# Patient Record
Sex: MALE | HISPANIC OR LATINO
[De-identification: names, ages, dates, MRNs, and addresses within clinical notes are randomized per-mention and may not be internally consistent; named-entity substitution may affect disease eponyms.]

---

## 2018-01-22 ENCOUNTER — CHARTING TRANS (OUTPATIENT)
Dept: OTHER | Age: 28
End: 2018-01-22

## 2018-01-26 ENCOUNTER — CHARTING TRANS (OUTPATIENT)
Dept: OTHER | Age: 28
End: 2018-01-26

## 2018-01-26 ENCOUNTER — LAB SERVICES (OUTPATIENT)
Dept: OTHER | Age: 28
End: 2018-01-26

## 2018-01-26 LAB
ALBUMIN SERPL BCG-MCNC: 5.3 G/DL (ref 3.6–5.1)
ALP SERPL-CCNC: 66 U/L (ref 30–130)
ALT SERPL W/O P-5'-P-CCNC: 41 U/L (ref 17–47)
AST SERPL-CCNC: 29 U/L (ref 14–43)
BASOPHIL %: 0.5 % (ref 0–1.2)
BASOPHIL ABSOLUTE #: 0 10*3/UL (ref 0–0.1)
BILIRUB SERPL-MCNC: 0.5 MG/DL (ref 0–1.3)
BUN SERPL-MCNC: 21 MG/DL (ref 6–27)
CALCIUM SERPL-MCNC: 9.9 MG/DL (ref 8.6–10.6)
CHLORIDE SERPL-SCNC: 101 MMOL/L (ref 96–107)
CREATININE, SERUM: 1 MG/DL (ref 0.6–1.6)
DIFFERENTIAL TYPE: ABNORMAL
EOSINOPHIL %: 0.7 % (ref 0–10)
EOSINOPHIL ABSOLUTE #: 0.1 10*3/UL (ref 0–0.5)
GFR SERPL CREATININE-BSD FRML MDRD: >60 ML/MIN/{1.73M2}
GFR SERPL CREATININE-BSD FRML MDRD: >60 ML/MIN/{1.73M2}
GLUCOSE SERPL-MCNC: 94 MG/DL (ref 70–200)
HCO3 SERPL-SCNC: 28 MMOL/L (ref 22–32)
HEMATOCRIT: 39.1 % (ref 40–51)
HEMOGLOBIN: 13.9 G/DL (ref 13.7–17.5)
LYMPH PERCENT: 22.9 % (ref 20.5–51.1)
LYMPHOCYTE ABSOLUTE #: 1.7 10*3/UL (ref 1.2–3.4)
MEAN CORPUSCULAR HGB CONCENTRATION: 35.5 % (ref 32–36)
MEAN CORPUSCULAR HGB: 29.9 PG (ref 27–34)
MEAN CORPUSCULAR VOLUME: 84.1 FL (ref 79–95)
MEAN PLATELET VOLUME: 11.5 FL (ref 8.6–12.4)
MONOCYTE ABSOLUTE #: 0.5 10*3/UL (ref 0.2–0.9)
MONOCYTE PERCENT: 7.2 % (ref 4.3–12.9)
NEUTROPHIL ABSOLUTE #: 5.1 10*3/UL (ref 1.4–6.5)
NEUTROPHIL PERCENT: 68.7 % (ref 34–73.5)
PLATELET COUNT: 175 10*3/UL (ref 150–400)
POTASSIUM SERPL-SCNC: 4.5 MMOL/L (ref 3.5–5.3)
PROT SERPL-MCNC: 8.5 G/DL (ref 6.4–8.5)
RED BLOOD CELL COUNT: 4.65 10*6/UL (ref 3.9–5.7)
RED CELL DISTRIBUTION WIDTH: 12.2 % (ref 11.3–14.8)
SODIUM SERPL-SCNC: 141 MMOL/L (ref 136–146)
TESTOST SERPL-MCNC: 230 NG/DL (ref 200–813)
TSH SERPL DL<=0.05 MIU/L-ACNC: 1.17 M[IU]/L (ref 0.3–4.82)
WHITE BLOOD CELL COUNT: 7.4 10*3/UL (ref 4–10)

## 2018-01-29 ENCOUNTER — CHARTING TRANS (OUTPATIENT)
Dept: OTHER | Age: 28
End: 2018-01-29

## 2018-02-01 ENCOUNTER — CHARTING TRANS (OUTPATIENT)
Dept: OTHER | Age: 28
End: 2018-02-01

## 2018-02-02 ENCOUNTER — CHARTING TRANS (OUTPATIENT)
Dept: OTHER | Age: 28
End: 2018-02-02

## 2018-02-02 ENCOUNTER — BH HISTORICAL (OUTPATIENT)
Dept: OTHER | Age: 28
End: 2018-02-02

## 2018-03-01 ENCOUNTER — BH HISTORICAL (OUTPATIENT)
Dept: OTHER | Age: 28
End: 2018-03-01

## 2018-03-02 ENCOUNTER — BH HISTORICAL (OUTPATIENT)
Dept: OTHER | Age: 28
End: 2018-03-02

## 2018-03-06 ENCOUNTER — CHARTING TRANS (OUTPATIENT)
Dept: OTHER | Age: 28
End: 2018-03-06

## 2018-03-14 ENCOUNTER — CHARTING TRANS (OUTPATIENT)
Dept: OTHER | Age: 28
End: 2018-03-14

## 2018-03-19 ENCOUNTER — CHARTING TRANS (OUTPATIENT)
Dept: OTHER | Age: 28
End: 2018-03-19

## 2018-05-07 ENCOUNTER — APPOINTMENT (OUTPATIENT)
Dept: GENERAL RADIOLOGY | Facility: HOSPITAL | Age: 28
End: 2018-05-07
Attending: EMERGENCY MEDICINE
Payer: COMMERCIAL

## 2018-05-07 ENCOUNTER — HOSPITAL ENCOUNTER (EMERGENCY)
Facility: HOSPITAL | Age: 28
Discharge: HOME | End: 2018-05-07
Attending: EMERGENCY MEDICINE
Payer: COMMERCIAL

## 2018-05-07 VITALS
HEIGHT: 68 IN | HEART RATE: 97 BPM | WEIGHT: 177 LBS | OXYGEN SATURATION: 97 % | TEMPERATURE: 98 F | RESPIRATION RATE: 18 BRPM | DIASTOLIC BLOOD PRESSURE: 78 MMHG | BODY MASS INDEX: 26.83 KG/M2 | SYSTOLIC BLOOD PRESSURE: 132 MMHG

## 2018-05-07 DIAGNOSIS — S50.12XA CONTUSION OF LEFT FOREARM, INITIAL ENCOUNTER: ICD-10-CM

## 2018-05-07 DIAGNOSIS — L08.9 SKIN INFECTION: Primary | ICD-10-CM

## 2018-05-07 PROCEDURE — 99283 EMERGENCY DEPT VISIT LOW MDM: CPT

## 2018-05-07 PROCEDURE — 73090 X-RAY EXAM OF FOREARM: CPT | Performed by: EMERGENCY MEDICINE

## 2018-05-07 RX ORDER — IBUPROFEN 600 MG/1
600 TABLET ORAL ONCE
Status: COMPLETED | OUTPATIENT
Start: 2018-05-07 | End: 2018-05-07

## 2018-05-07 RX ORDER — CEPHALEXIN 500 MG/1
500 CAPSULE ORAL 4 TIMES DAILY
Qty: 10 CAPSULE | Refills: 0 | Status: SHIPPED | OUTPATIENT
Start: 2018-05-07 | End: 2018-05-14

## 2018-05-07 NOTE — ED INITIAL ASSESSMENT (HPI)
Patient reports on Saturday he was wrestling with some friends, fell and hurt his left forearm.   + pain and swelling   Denies other injury

## 2018-05-07 NOTE — ED PROVIDER NOTES
Patient Seen in: [facility] Emergency Department    History   Patient presents with:  Upper Extremity Injury (musculoskeletal)    Stated Complaint: left forearm injury- Open fx?     HPI    65-year-old male complaining of left arm pain the patient fel diagnosis)  Contusion of left forearm, initial encounter    Disposition:  Discharge  5/7/2018  1:10 pm    Follow-up:  Sara Byrd MD    In 1 week          Medications Prescribed:  Current Discharge Medication List    START taking these medications

## 2018-11-23 ENCOUNTER — IMAGING SERVICES (OUTPATIENT)
Dept: OTHER | Age: 28
End: 2018-11-23

## 2019-01-01 ENCOUNTER — EXTERNAL RECORD (OUTPATIENT)
Dept: HEALTH INFORMATION MANAGEMENT | Facility: OTHER | Age: 29
End: 2019-01-01

## 2019-03-06 VITALS
HEIGHT: 69 IN | BODY MASS INDEX: 25.62 KG/M2 | SYSTOLIC BLOOD PRESSURE: 122 MMHG | WEIGHT: 173 LBS | DIASTOLIC BLOOD PRESSURE: 80 MMHG | TEMPERATURE: 98.2 F | OXYGEN SATURATION: 98 % | HEART RATE: 74 BPM

## 2019-03-06 VITALS
SYSTOLIC BLOOD PRESSURE: 126 MMHG | OXYGEN SATURATION: 98 % | BODY MASS INDEX: 25.92 KG/M2 | HEIGHT: 69 IN | DIASTOLIC BLOOD PRESSURE: 80 MMHG | WEIGHT: 175 LBS | HEART RATE: 76 BPM

## 2019-05-09 ENCOUNTER — MED INFO FORMS (OUTPATIENT)
Dept: HEALTH INFORMATION MANAGEMENT | Facility: OTHER | Age: 29
End: 2019-05-09

## 2019-05-09 ENCOUNTER — IMAGING SERVICES (OUTPATIENT)
Dept: GENERAL RADIOLOGY | Age: 29
End: 2019-05-09
Attending: FAMILY MEDICINE

## 2019-05-09 ENCOUNTER — OFFICE VISIT (OUTPATIENT)
Dept: SPORTS MEDICINE | Age: 29
End: 2019-05-09

## 2019-05-09 ENCOUNTER — TELEPHONE (OUTPATIENT)
Dept: SCHEDULING | Age: 29
End: 2019-05-09

## 2019-05-09 VITALS — WEIGHT: 189 LBS | BODY MASS INDEX: 28.64 KG/M2 | HEIGHT: 68 IN

## 2019-05-09 DIAGNOSIS — S42.021A CLOSED DISPLACED FRACTURE OF SHAFT OF RIGHT CLAVICLE, INITIAL ENCOUNTER: Primary | ICD-10-CM

## 2019-05-09 DIAGNOSIS — M25.611 DECREASED RIGHT SHOULDER RANGE OF MOTION: ICD-10-CM

## 2019-05-09 DIAGNOSIS — S42.001A CLOSED NONDISPLACED FRACTURE OF RIGHT CLAVICLE, UNSPECIFIED PART OF CLAVICLE, INITIAL ENCOUNTER: ICD-10-CM

## 2019-05-09 DIAGNOSIS — F90.2 ATTENTION DEFICIT HYPERACTIVITY DISORDER (ADHD), COMBINED TYPE: ICD-10-CM

## 2019-05-09 DIAGNOSIS — M25.511 ACUTE PAIN OF RIGHT SHOULDER: ICD-10-CM

## 2019-05-09 PROCEDURE — 99204 OFFICE O/P NEW MOD 45 MIN: CPT | Performed by: FAMILY MEDICINE

## 2019-05-09 PROCEDURE — 73000 X-RAY EXAM OF COLLAR BONE: CPT | Performed by: RADIOLOGY

## 2019-05-09 RX ORDER — ESCITALOPRAM OXALATE 10 MG/1
15 TABLET ORAL
COMMUNITY
Start: 2018-02-21 | End: 2019-05-28

## 2019-05-09 RX ORDER — ALPRAZOLAM 1 MG/1
1 TABLET ORAL
COMMUNITY
End: 2019-05-28

## 2019-05-09 RX ORDER — IBUPROFEN 800 MG/1
800 TABLET ORAL EVERY 6 HOURS PRN
COMMUNITY

## 2019-05-09 RX ORDER — DEXTROAMPHETAMINE SACCHARATE, AMPHETAMINE ASPARTATE, DEXTROAMPHETAMINE SULFATE AND AMPHETAMINE SULFATE 5; 5; 5; 5 MG/1; MG/1; MG/1; MG/1
TABLET ORAL
Refills: 0 | COMMUNITY
Start: 2019-04-08 | End: 2019-08-06 | Stop reason: ALTCHOICE

## 2019-05-09 RX ORDER — MELOXICAM 15 MG/1
15 TABLET ORAL
COMMUNITY
Start: 2018-05-10 | End: 2019-08-06 | Stop reason: ALTCHOICE

## 2019-05-09 RX ORDER — TESTOSTERONE GEL, 1% 10 MG/G
GEL TRANSDERMAL
COMMUNITY
Start: 2018-02-02 | End: 2019-08-06 | Stop reason: ALTCHOICE

## 2019-05-09 RX ORDER — HYDROCODONE BITARTRATE AND ACETAMINOPHEN 5; 325 MG/1; MG/1
1 TABLET ORAL EVERY 6 HOURS PRN
COMMUNITY
End: 2019-05-28

## 2019-05-09 RX ORDER — DEXTROAMPHETAMINE SACCHARATE, AMPHETAMINE ASPARTATE MONOHYDRATE, DEXTROAMPHETAMINE SULFATE AND AMPHETAMINE SULFATE 7.5; 7.5; 7.5; 7.5 MG/1; MG/1; MG/1; MG/1
CAPSULE, EXTENDED RELEASE ORAL
Refills: 0 | COMMUNITY
Start: 2019-04-24 | End: 2019-08-06 | Stop reason: ALTCHOICE

## 2019-05-09 RX ORDER — ATOMOXETINE 40 MG/1
40 CAPSULE ORAL
COMMUNITY
Start: 2018-02-21 | End: 2019-08-06 | Stop reason: ALTCHOICE

## 2019-05-09 RX ORDER — HYDROXYZINE HYDROCHLORIDE 25 MG/1
25 TABLET, FILM COATED ORAL
COMMUNITY
Start: 2018-02-21 | End: 2019-05-28

## 2019-05-09 ASSESSMENT — ENCOUNTER SYMPTOMS
COLOR CHANGE: 0
BRUISES/BLEEDS EASILY: 0
ACTIVITY CHANGE: 0
FEVER: 0
PHOTOPHOBIA: 0
CHILLS: 0
WEAKNESS: 0
NUMBNESS: 0
BACK PAIN: 0
WOUND: 0
CHEST TIGHTNESS: 0
NAUSEA: 0
DIARRHEA: 0
SHORTNESS OF BREATH: 0
ADENOPATHY: 0
VOMITING: 0
WHEEZING: 0

## 2019-05-10 ENCOUNTER — TELEPHONE (OUTPATIENT)
Dept: SPORTS MEDICINE | Age: 29
End: 2019-05-10

## 2019-05-10 PROBLEM — F90.2 ATTENTION DEFICIT HYPERACTIVITY DISORDER (ADHD), COMBINED TYPE: Status: ACTIVE | Noted: 2019-05-10

## 2019-05-13 ENCOUNTER — TELEPHONE (OUTPATIENT)
Dept: SPORTS MEDICINE | Age: 29
End: 2019-05-13

## 2019-05-16 ENCOUNTER — TELEPHONE (OUTPATIENT)
Dept: SPORTS MEDICINE | Age: 29
End: 2019-05-16

## 2019-05-20 ENCOUNTER — TELEPHONE (OUTPATIENT)
Dept: ORTHOPEDICS | Age: 29
End: 2019-05-20

## 2019-05-28 ENCOUNTER — OFFICE VISIT (OUTPATIENT)
Dept: SPORTS MEDICINE | Age: 29
End: 2019-05-28

## 2019-05-28 ENCOUNTER — IMAGING SERVICES (OUTPATIENT)
Dept: GENERAL RADIOLOGY | Age: 29
End: 2019-05-28
Attending: FAMILY MEDICINE

## 2019-05-28 VITALS
BODY MASS INDEX: 27.52 KG/M2 | HEART RATE: 68 BPM | DIASTOLIC BLOOD PRESSURE: 76 MMHG | SYSTOLIC BLOOD PRESSURE: 118 MMHG | WEIGHT: 181 LBS

## 2019-05-28 DIAGNOSIS — S42.021A CLOSED DISPLACED FRACTURE OF SHAFT OF RIGHT CLAVICLE, INITIAL ENCOUNTER: ICD-10-CM

## 2019-05-28 DIAGNOSIS — M89.8X1 PAIN OF RIGHT CLAVICLE: ICD-10-CM

## 2019-05-28 DIAGNOSIS — S42.021D CLOSED DISPLACED FRACTURE OF SHAFT OF RIGHT CLAVICLE WITH ROUTINE HEALING, SUBSEQUENT ENCOUNTER: Primary | ICD-10-CM

## 2019-05-28 PROCEDURE — 99214 OFFICE O/P EST MOD 30 MIN: CPT | Performed by: FAMILY MEDICINE

## 2019-05-28 PROCEDURE — 73000 X-RAY EXAM OF COLLAR BONE: CPT | Performed by: RADIOLOGY

## 2019-05-28 ASSESSMENT — ENCOUNTER SYMPTOMS
NEUROLOGICAL NEGATIVE: 1
RESPIRATORY NEGATIVE: 1
EYES NEGATIVE: 1
GASTROINTESTINAL NEGATIVE: 1
CONSTITUTIONAL NEGATIVE: 1
NERVOUS/ANXIOUS: 1

## 2019-05-30 ENCOUNTER — HOSPITAL ENCOUNTER (EMERGENCY)
Facility: HOSPITAL | Age: 29
Discharge: HOME | End: 2019-05-30
Attending: PEDIATRICS
Payer: COMMERCIAL

## 2019-05-30 ENCOUNTER — APPOINTMENT (OUTPATIENT)
Dept: GENERAL RADIOLOGY | Facility: HOSPITAL | Age: 29
End: 2019-05-30
Attending: PEDIATRICS
Payer: COMMERCIAL

## 2019-05-30 VITALS
WEIGHT: 180.75 LBS | OXYGEN SATURATION: 99 % | HEART RATE: 58 BPM | RESPIRATION RATE: 16 BRPM | DIASTOLIC BLOOD PRESSURE: 84 MMHG | SYSTOLIC BLOOD PRESSURE: 132 MMHG | BODY MASS INDEX: 28 KG/M2 | TEMPERATURE: 98 F

## 2019-05-30 DIAGNOSIS — S42.021D CLOSED DISPLACED FRACTURE OF SHAFT OF RIGHT CLAVICLE WITH ROUTINE HEALING, SUBSEQUENT ENCOUNTER: Primary | ICD-10-CM

## 2019-05-30 PROCEDURE — 73000 X-RAY EXAM OF COLLAR BONE: CPT | Performed by: PEDIATRICS

## 2019-05-30 PROCEDURE — 99283 EMERGENCY DEPT VISIT LOW MDM: CPT

## 2019-05-30 RX ORDER — IBUPROFEN 600 MG/1
600 TABLET ORAL ONCE
Status: COMPLETED | OUTPATIENT
Start: 2019-05-30 | End: 2019-05-30

## 2019-05-30 RX ORDER — HYDROCODONE BITARTRATE AND ACETAMINOPHEN 5; 325 MG/1; MG/1
1 TABLET ORAL ONCE
Status: COMPLETED | OUTPATIENT
Start: 2019-05-30 | End: 2019-05-30
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
## 2019-05-31 NOTE — ED PROVIDER NOTES
Patient Seen in: [facility] Emergency Department    History   Patient presents with:  Upper Extremity Injury (musculoskeletal)    Stated Complaint: right clavicle injury     HPI    Patient is a 21-year-old male complaining of pain in his right colla nontender, nondistended. Bowel sounds present throughout. Extremities: Warm and well perfused. Dermatologic exam: No rashes or lesions. Neurologic exam: Cranial nerves 2-12 grossly intact.     Orthopedic exam: Tenderness to palpation of callus of the mi drive him home.               Disposition and Plan     Clinical Impression:  Closed displaced fracture of shaft of right clavicle with routine healing, subsequent encounter  (primary encounter diagnosis)    Disposition:  Discharge  5/30/2019  3:04 pm    Fol

## 2019-07-15 ENCOUNTER — APPOINTMENT (OUTPATIENT)
Dept: GENERAL RADIOLOGY | Age: 29
End: 2019-07-15
Attending: FAMILY MEDICINE
Payer: COMMERCIAL

## 2019-07-15 ENCOUNTER — HOSPITAL ENCOUNTER (OUTPATIENT)
Age: 29
Discharge: HOME | End: 2019-07-15
Attending: FAMILY MEDICINE
Payer: COMMERCIAL

## 2019-07-15 VITALS
DIASTOLIC BLOOD PRESSURE: 106 MMHG | TEMPERATURE: 98 F | SYSTOLIC BLOOD PRESSURE: 160 MMHG | HEART RATE: 96 BPM | OXYGEN SATURATION: 96 % | RESPIRATION RATE: 16 BRPM

## 2019-07-15 DIAGNOSIS — T14.8XXA ABRASION: ICD-10-CM

## 2019-07-15 DIAGNOSIS — S06.0X1A CONCUSSION WITH LOSS OF CONSCIOUSNESS OF 30 MINUTES OR LESS, INITIAL ENCOUNTER: ICD-10-CM

## 2019-07-15 DIAGNOSIS — S00.83XA CONTUSION OF FACE, INITIAL ENCOUNTER: ICD-10-CM

## 2019-07-15 DIAGNOSIS — S01.81XA FACIAL LACERATION, INITIAL ENCOUNTER: ICD-10-CM
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
## 2019-07-15 PROCEDURE — 70200 X-RAY EXAM OF EYE SOCKETS: CPT | Performed by: FAMILY MEDICINE

## 2019-07-15 PROCEDURE — 99204 OFFICE O/P NEW MOD 45 MIN: CPT

## 2019-07-15 PROCEDURE — 99213 OFFICE O/P EST LOW 20 MIN: CPT

## 2019-07-15 RX ORDER — ONDANSETRON 4 MG/1
4 TABLET, ORALLY DISINTEGRATING ORAL EVERY 4 HOURS PRN
Qty: 10 TABLET | Refills: 0 | Status: SHIPPED | OUTPATIENT
Start: 2019-07-15 | End: 2019-07-22

## 2019-07-15 RX ORDER — CEPHALEXIN 500 MG/1
500 CAPSULE ORAL 3 TIMES DAILY
Qty: 21 CAPSULE | Refills: 0 | Status: SHIPPED | OUTPATIENT
Start: 2019-07-15 | End: 2019-07-22

## 2019-07-15 RX ORDER — MELOXICAM 15 MG/1
15 TABLET ORAL DAILY PRN
Qty: 30 TABLET | Refills: 0 | Status: SHIPPED | OUTPATIENT
Start: 2019-07-15 | End: 2020-09-17
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
## 2019-08-06 ENCOUNTER — WALK IN (OUTPATIENT)
Dept: URGENT CARE | Age: 29
End: 2019-08-06

## 2019-08-06 DIAGNOSIS — M25.511 RIGHT SHOULDER PAIN, UNSPECIFIED CHRONICITY: Primary | ICD-10-CM

## 2019-08-06 DIAGNOSIS — Z87.81 HISTORY OF FRACTURE OF CLAVICLE: ICD-10-CM

## 2019-08-06 DIAGNOSIS — F41.9 ANXIETY: ICD-10-CM

## 2019-08-06 PROCEDURE — 99213 OFFICE O/P EST LOW 20 MIN: CPT | Performed by: FAMILY MEDICINE

## 2019-08-06 ASSESSMENT — PAIN SCALES - GENERAL: PAINLEVEL: 5-6

## 2019-11-04 ENCOUNTER — TELEPHONE (OUTPATIENT)
Dept: SCHEDULING | Age: 29
End: 2019-11-04

## 2019-11-05 ENCOUNTER — OFFICE VISIT (OUTPATIENT)
Dept: ORTHOPEDICS | Age: 29
End: 2019-11-05

## 2019-11-05 VITALS — HEIGHT: 69 IN | WEIGHT: 198 LBS | BODY MASS INDEX: 29.33 KG/M2

## 2019-11-05 DIAGNOSIS — S62.221A CLOSED DISPLACED ROLANDO'S FRACTURE OF RIGHT THUMB, INITIAL ENCOUNTER: Primary | ICD-10-CM

## 2019-11-05 DIAGNOSIS — S62.501A CLOSED FRACTURE OF RIGHT THUMB: Primary | ICD-10-CM

## 2019-11-05 PROCEDURE — 99203 OFFICE O/P NEW LOW 30 MIN: CPT | Performed by: ORTHOPAEDIC SURGERY

## 2019-11-06 ENCOUNTER — APPOINTMENT (OUTPATIENT)
Dept: OTHER | Facility: PHYSICIAN GROUP | Age: 29
End: 2019-11-06
Attending: ORTHOPAEDIC SURGERY

## 2019-11-06 DIAGNOSIS — S62.231D CLOSED DISPLACED FRACTURE OF BASE OF FIRST METACARPAL BONE OF RIGHT HAND WITH ROUTINE HEALING, UNSPECIFIED FRACTURE MORPHOLOGY, SUBSEQUENT ENCOUNTER: Primary | ICD-10-CM

## 2019-11-06 PROCEDURE — 26665 TREAT THUMB FRACTURE: CPT | Performed by: ORTHOPAEDIC SURGERY

## 2019-11-07 ENCOUNTER — EXTERNAL RECORD (OUTPATIENT)
Dept: ORTHOPEDICS | Age: 29
End: 2019-11-07

## 2019-11-12 ENCOUNTER — APPOINTMENT (OUTPATIENT)
Dept: ORTHOPEDICS | Age: 29
End: 2019-11-12

## 2019-11-14 ENCOUNTER — TELEPHONE (OUTPATIENT)
Dept: SCHEDULING | Age: 29
End: 2019-11-14

## 2019-11-19 ENCOUNTER — IMAGING SERVICES (OUTPATIENT)
Dept: GENERAL RADIOLOGY | Age: 29
End: 2019-11-19
Attending: PHYSICIAN ASSISTANT

## 2019-11-19 ENCOUNTER — OFFICE VISIT (OUTPATIENT)
Dept: ORTHOPEDICS | Age: 29
End: 2019-11-19

## 2019-11-19 DIAGNOSIS — S62.211D CLOSED BENNETT'S FRACTURE OF RIGHT THUMB WITH ROUTINE HEALING, SUBSEQUENT ENCOUNTER: Primary | ICD-10-CM

## 2019-11-19 DIAGNOSIS — S62.221D CLOSED DISPLACED ROLANDO'S FRACTURE OF RIGHT THUMB WITH ROUTINE HEALING, SUBSEQUENT ENCOUNTER: ICD-10-CM

## 2019-11-19 PROCEDURE — 99024 POSTOP FOLLOW-UP VISIT: CPT | Performed by: PHYSICIAN ASSISTANT

## 2019-11-19 PROCEDURE — L3809 WHFO W/O JOINTS PRE OTS: HCPCS

## 2019-11-19 PROCEDURE — 73130 X-RAY EXAM OF HAND: CPT | Performed by: RADIOLOGY

## 2019-11-19 RX ORDER — HYDROCODONE BITARTRATE AND ACETAMINOPHEN 5; 325 MG/1; MG/1
1 TABLET ORAL EVERY 6 HOURS PRN
Qty: 12 TABLET | Refills: 0 | Status: SHIPPED | OUTPATIENT
Start: 2019-11-19 | End: 2019-11-19 | Stop reason: DRUGHIGH

## 2019-11-19 RX ORDER — HYDROCODONE BITARTRATE AND ACETAMINOPHEN 5; 325 MG/1; MG/1
TABLET ORAL
Qty: 10 TABLET | Refills: 0 | Status: SHIPPED | OUTPATIENT
Start: 2019-11-19 | End: 2019-11-19

## 2019-12-06 ENCOUNTER — TELEPHONE (OUTPATIENT)
Dept: ORTHOPEDICS | Age: 29
End: 2019-12-06

## 2019-12-13 ENCOUNTER — OFFICE VISIT (OUTPATIENT)
Dept: ORTHOPEDICS | Age: 29
End: 2019-12-13

## 2019-12-13 ENCOUNTER — IMAGING SERVICES (OUTPATIENT)
Dept: GENERAL RADIOLOGY | Age: 29
End: 2019-12-13
Attending: PHYSICIAN ASSISTANT

## 2019-12-13 DIAGNOSIS — S62.211D CLOSED BENNETT'S FRACTURE OF RIGHT THUMB WITH ROUTINE HEALING, SUBSEQUENT ENCOUNTER: Primary | ICD-10-CM

## 2019-12-13 DIAGNOSIS — S62.211D CLOSED BENNETT'S FRACTURE OF RIGHT THUMB WITH ROUTINE HEALING, SUBSEQUENT ENCOUNTER: ICD-10-CM

## 2019-12-13 PROCEDURE — 99024 POSTOP FOLLOW-UP VISIT: CPT | Performed by: PHYSICIAN ASSISTANT

## 2019-12-13 PROCEDURE — 73130 X-RAY EXAM OF HAND: CPT | Performed by: RADIOLOGY

## 2019-12-13 RX ORDER — CITALOPRAM 40 MG/1
TABLET ORAL
Refills: 5 | COMMUNITY
Start: 2019-12-02

## 2019-12-13 RX ORDER — DEXTROAMPHETAMINE SACCHARATE, AMPHETAMINE ASPARTATE, DEXTROAMPHETAMINE SULFATE AND AMPHETAMINE SULFATE 5; 5; 5; 5 MG/1; MG/1; MG/1; MG/1
TABLET ORAL
Refills: 0 | COMMUNITY
Start: 2019-12-02

## 2019-12-18 ENCOUNTER — TELEPHONE (OUTPATIENT)
Dept: SCHEDULING | Age: 29
End: 2019-12-18

## 2019-12-30 ENCOUNTER — TELEPHONE (OUTPATIENT)
Dept: ORTHOPEDICS | Age: 29
End: 2019-12-30

## 2020-01-01 ENCOUNTER — EXTERNAL RECORD (OUTPATIENT)
Dept: HEALTH INFORMATION MANAGEMENT | Facility: OTHER | Age: 30
End: 2020-01-01

## 2020-01-07 ENCOUNTER — OFFICE VISIT (OUTPATIENT)
Dept: ORTHOPEDICS | Age: 30
End: 2020-01-07

## 2020-01-07 DIAGNOSIS — S62.211D CLOSED BENNETT'S FRACTURE OF RIGHT THUMB WITH ROUTINE HEALING, SUBSEQUENT ENCOUNTER: ICD-10-CM

## 2020-01-07 DIAGNOSIS — S62.221A CLOSED DISPLACED ROLANDO'S FRACTURE OF RIGHT THUMB, INITIAL ENCOUNTER: Primary | ICD-10-CM

## 2020-01-07 PROCEDURE — 99024 POSTOP FOLLOW-UP VISIT: CPT | Performed by: PHYSICIAN ASSISTANT

## 2020-01-07 RX ORDER — HYDROCODONE BITARTRATE AND ACETAMINOPHEN 10; 325 MG/1; MG/1
TABLET ORAL
Refills: 0 | COMMUNITY
Start: 2020-01-03

## 2020-01-07 RX ORDER — ENALAPRIL MALEATE AND HYDROCHLOROTHIAZIDE 10; 25 MG/1; MG/1
TABLET ORAL
Refills: 5 | COMMUNITY
Start: 2019-12-02

## 2020-07-13 ENCOUNTER — TELEPHONE (OUTPATIENT)
Dept: GENERAL RADIOLOGY | Age: 30
End: 2020-07-13

## 2020-07-13 ENCOUNTER — TELEPHONE (OUTPATIENT)
Dept: ORTHOPEDICS | Age: 30
End: 2020-07-13

## 2020-07-14 ENCOUNTER — APPOINTMENT (OUTPATIENT)
Dept: ORTHOPEDICS | Age: 30
End: 2020-07-14

## 2020-09-17 ENCOUNTER — APPOINTMENT (OUTPATIENT)
Dept: GENERAL RADIOLOGY | Facility: HOSPITAL | Age: 30
End: 2020-09-17
Attending: EMERGENCY MEDICINE
Payer: MEDICAID

## 2020-09-17 ENCOUNTER — HOSPITAL ENCOUNTER (EMERGENCY)
Facility: HOSPITAL | Age: 30
Discharge: HOME | End: 2020-09-17
Attending: EMERGENCY MEDICINE
Payer: MEDICAID

## 2020-09-17 ENCOUNTER — APPOINTMENT (OUTPATIENT)
Dept: CT IMAGING | Facility: HOSPITAL | Age: 30
End: 2020-09-17
Attending: EMERGENCY MEDICINE
Payer: MEDICAID

## 2020-09-17 VITALS
TEMPERATURE: 97 F | SYSTOLIC BLOOD PRESSURE: 148 MMHG | HEART RATE: 57 BPM | DIASTOLIC BLOOD PRESSURE: 97 MMHG | RESPIRATION RATE: 16 BRPM | OXYGEN SATURATION: 98 % | WEIGHT: 195 LBS | HEIGHT: 69 IN | BODY MASS INDEX: 28.88 KG/M2

## 2020-09-17 DIAGNOSIS — R09.1 PLEURISY: Primary | ICD-10-CM

## 2020-09-17 LAB
ALBUMIN SERPL-MCNC: 3.9 G/DL (ref 3.4–5)
ALBUMIN/GLOB SERPL: 1.1 {RATIO} (ref 1–2)
ALP LIVER SERPL-CCNC: 92 U/L (ref 45–117)
ALT SERPL-CCNC: 60 U/L (ref 16–61)
ANION GAP SERPL CALC-SCNC: 0 MMOL/L (ref 0–18)
AST SERPL-CCNC: 60 U/L (ref 15–37)
ATRIAL RATE: 60 BPM
BASOPHILS # BLD AUTO: 0.05 X10(3) UL (ref 0–0.2)
BASOPHILS NFR BLD AUTO: 0.7 %
BILIRUB SERPL-MCNC: 0.4 MG/DL (ref 0.1–2)
BUN BLD-MCNC: 13 MG/DL (ref 7–18)
BUN/CREAT SERPL: 12 (ref 10–20)
CALCIUM BLD-MCNC: 8.9 MG/DL (ref 8.5–10.1)
CHLORIDE SERPL-SCNC: 109 MMOL/L (ref 98–112)
CO2 SERPL-SCNC: 29 MMOL/L (ref 21–32)
CREAT BLD-MCNC: 1.08 MG/DL (ref 0.7–1.3)
D-DIMER: 0.57 UG/ML FEU (ref ?–0.5)
DEPRECATED RDW RBC AUTO: 36.6 FL (ref 35.1–46.3)
EOSINOPHIL # BLD AUTO: 0.21 X10(3) UL (ref 0–0.7)
EOSINOPHIL NFR BLD AUTO: 3 %
ERYTHROCYTE [DISTWIDTH] IN BLOOD BY AUTOMATED COUNT: 11.5 % (ref 11–15)
GLOBULIN PLAS-MCNC: 3.7 G/DL (ref 2.8–4.4)
GLUCOSE BLD-MCNC: 109 MG/DL (ref 70–99)
HCT VFR BLD AUTO: 44.1 % (ref 39–53)
HGB BLD-MCNC: 15.1 G/DL (ref 13–17.5)
IMM GRANULOCYTES # BLD AUTO: 0.01 X10(3) UL (ref 0–1)
IMM GRANULOCYTES NFR BLD: 0.1 %
LYMPHOCYTES # BLD AUTO: 1.55 X10(3) UL (ref 1–4)
LYMPHOCYTES NFR BLD AUTO: 22.3 %
M PROTEIN MFR SERPL ELPH: 7.6 G/DL (ref 6.4–8.2)
MCH RBC QN AUTO: 29.5 PG (ref 26–34)
MCHC RBC AUTO-ENTMCNC: 34.2 G/DL (ref 31–37)
MCV RBC AUTO: 86.3 FL (ref 80–100)
MONOCYTES # BLD AUTO: 0.66 X10(3) UL (ref 0.1–1)
MONOCYTES NFR BLD AUTO: 9.5 %
NEUTROPHILS # BLD AUTO: 4.48 X10 (3) UL (ref 1.5–7.7)
NEUTROPHILS # BLD AUTO: 4.48 X10(3) UL (ref 1.5–7.7)
NEUTROPHILS NFR BLD AUTO: 64.4 %
OSMOLALITY SERPL CALC.SUM OF ELEC: 287 MOSM/KG (ref 275–295)
P AXIS: 47 DEGREES
P-R INTERVAL: 128 MS
PLATELET # BLD AUTO: 214 10(3)UL (ref 150–450)
POTASSIUM SERPL-SCNC: 4.4 MMOL/L (ref 3.5–5.1)
Q-T INTERVAL: 402 MS
QRS DURATION: 120 MS
QTC CALCULATION (BEZET): 402 MS
R AXIS: 73 DEGREES
RBC # BLD AUTO: 5.11 X10(6)UL (ref 4.3–5.7)
SODIUM SERPL-SCNC: 138 MMOL/L (ref 136–145)
T AXIS: 61 DEGREES
TROPONIN I SERPL-MCNC: <0.045 NG/ML (ref ?–0.04)
VENTRICULAR RATE: 60 BPM
WBC # BLD AUTO: 7 X10(3) UL (ref 4–11)

## 2020-09-17 PROCEDURE — 85379 FIBRIN DEGRADATION QUANT: CPT | Performed by: EMERGENCY MEDICINE

## 2020-09-17 PROCEDURE — 99285 EMERGENCY DEPT VISIT HI MDM: CPT

## 2020-09-17 PROCEDURE — 85025 COMPLETE CBC W/AUTO DIFF WBC: CPT | Performed by: EMERGENCY MEDICINE

## 2020-09-17 PROCEDURE — 93010 ELECTROCARDIOGRAM REPORT: CPT

## 2020-09-17 PROCEDURE — 36415 COLL VENOUS BLD VENIPUNCTURE: CPT

## 2020-09-17 PROCEDURE — 80053 COMPREHEN METABOLIC PANEL: CPT | Performed by: EMERGENCY MEDICINE

## 2020-09-17 PROCEDURE — 84484 ASSAY OF TROPONIN QUANT: CPT | Performed by: EMERGENCY MEDICINE

## 2020-09-17 PROCEDURE — 93005 ELECTROCARDIOGRAM TRACING: CPT

## 2020-09-17 PROCEDURE — 71275 CT ANGIOGRAPHY CHEST: CPT | Performed by: EMERGENCY MEDICINE

## 2020-09-17 PROCEDURE — 71046 X-RAY EXAM CHEST 2 VIEWS: CPT | Performed by: EMERGENCY MEDICINE

## 2020-09-17 RX ORDER — HYDROCODONE BITARTRATE AND ACETAMINOPHEN 5; 325 MG/1; MG/1
1-2 TABLET ORAL EVERY 6 HOURS PRN
Qty: 10 TABLET | Refills: 0 | Status: SHIPPED | OUTPATIENT
Start: 2020-09-17 | End: 2020-09-17

## 2020-09-17 NOTE — ED INITIAL ASSESSMENT (HPI)
Patient states he woke up this morning and was having difficulty breathing. Patient states one week ago he was seen at [facility] and was told he had an abnormal EKG.

## 2020-09-17 NOTE — ED NOTES
RN at bedside, pt asks, Ry Mcdermott are you going to do about the pain when I take a deep breath? I can't smoke a joint because the pain is so bad. \" RN informed patient that inhaling the smoke could be part of his problem.  Pt states, \"it is for medical purpos

## 2020-09-17 NOTE — ED PROVIDER NOTES
Patient Seen in: [facility] Emergency Department      History   Patient presents with:  Dyspnea ANANTH SOB    Stated Complaint: ananth    HPI    Patient is a pleasant 26-year-old male who presents the emergency room with shortness of breath.   Says he has membranes. No meningismus. No adenopathy  Lungs: No tachypnea. Lungs clear to auscultation bilaterally without rales/rhonchi. Equal breath sounds bilaterally  Cardiac: No tachycardia. No murmurs. Regular rate and rhythm.   Abdomen: Soft and nontender Unclear etiology. EKG nonrevealing. Despite prolonged pain troponins negative. Labs reviewed. D-dimer mildly elevated leading to a CTA chest which was negative for acute embolism. Discussed with patient, unclear etiology.   Likely pleurisy, chronic dys

## 2020-09-20 ENCOUNTER — APPOINTMENT (OUTPATIENT)
Dept: GENERAL RADIOLOGY | Facility: HOSPITAL | Age: 30
End: 2020-09-20
Attending: EMERGENCY MEDICINE
Payer: MEDICAID

## 2020-09-20 ENCOUNTER — HOSPITAL ENCOUNTER (EMERGENCY)
Facility: HOSPITAL | Age: 30
Discharge: HOME | End: 2020-09-20
Attending: EMERGENCY MEDICINE
Payer: MEDICAID

## 2020-09-20 VITALS
HEART RATE: 87 BPM | SYSTOLIC BLOOD PRESSURE: 142 MMHG | OXYGEN SATURATION: 99 % | TEMPERATURE: 99 F | DIASTOLIC BLOOD PRESSURE: 94 MMHG | RESPIRATION RATE: 18 BRPM

## 2020-09-20 DIAGNOSIS — S93.401A MODERATE RIGHT ANKLE SPRAIN, INITIAL ENCOUNTER: Primary | ICD-10-CM

## 2020-09-20 PROCEDURE — 99283 EMERGENCY DEPT VISIT LOW MDM: CPT

## 2020-09-20 PROCEDURE — 73630 X-RAY EXAM OF FOOT: CPT | Performed by: EMERGENCY MEDICINE

## 2020-09-20 PROCEDURE — 99284 EMERGENCY DEPT VISIT MOD MDM: CPT

## 2020-09-20 PROCEDURE — 73610 X-RAY EXAM OF ANKLE: CPT | Performed by: EMERGENCY MEDICINE

## 2020-09-20 RX ORDER — HYDROCODONE BITARTRATE AND ACETAMINOPHEN 5; 325 MG/1; MG/1
1-2 TABLET ORAL EVERY 6 HOURS PRN
Qty: 10 TABLET | Refills: 0 | Status: SHIPPED | OUTPATIENT
Start: 2020-09-20 | End: 2020-09-27

## 2020-09-20 RX ORDER — HYDROCODONE BITARTRATE AND ACETAMINOPHEN 5; 325 MG/1; MG/1
1 TABLET ORAL ONCE
Status: COMPLETED | OUTPATIENT
Start: 2020-09-20 | End: 2020-09-20

## 2020-09-21 NOTE — ED PROVIDER NOTES
Patient Seen in: [facility] Emergency Department      History   Patient presents with:  Leg or Foot Injury    Stated Complaint: Rt ankle injury    HPI    21-year-old male presents for evaluation of right ankle injury.   Patient was playing basketball mortise is intact. Subtalar joint is intact. CONCLUSION:  There is no acute fracture in the ankle.    Dictated by (CST): Alexander Jacobs MD on 9/20/2020 at 8:57 PM     Finalized by (CST): Alexander Jacobs MD on 9/20/2020 at 8:57 PM       Xr Foot, Com
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
## 2020-09-22 LAB — SARS-COV-2 BY PCR: NOT DETECTED

## 2020-10-04 ENCOUNTER — APPOINTMENT (OUTPATIENT)
Dept: GENERAL RADIOLOGY | Facility: HOSPITAL | Age: 30
End: 2020-10-04
Attending: EMERGENCY MEDICINE
Payer: MEDICAID

## 2020-10-04 ENCOUNTER — HOSPITAL ENCOUNTER (EMERGENCY)
Facility: HOSPITAL | Age: 30
Discharge: HOME | End: 2020-10-04
Attending: EMERGENCY MEDICINE
Payer: MEDICAID

## 2020-10-04 VITALS
SYSTOLIC BLOOD PRESSURE: 149 MMHG | HEIGHT: 69 IN | TEMPERATURE: 98 F | HEART RATE: 63 BPM | BODY MASS INDEX: 28.88 KG/M2 | OXYGEN SATURATION: 98 % | DIASTOLIC BLOOD PRESSURE: 90 MMHG | WEIGHT: 195 LBS | RESPIRATION RATE: 13 BRPM

## 2020-10-04 DIAGNOSIS — R07.89 CHEST PAIN, NON-CARDIAC: Primary | ICD-10-CM

## 2020-10-04 DIAGNOSIS — R07.89 CHEST PAIN, MUSCULOSKELETAL: ICD-10-CM

## 2020-10-04 PROCEDURE — 84484 ASSAY OF TROPONIN QUANT: CPT | Performed by: EMERGENCY MEDICINE

## 2020-10-04 PROCEDURE — 96360 HYDRATION IV INFUSION INIT: CPT

## 2020-10-04 PROCEDURE — 93010 ELECTROCARDIOGRAM REPORT: CPT

## 2020-10-04 PROCEDURE — 99285 EMERGENCY DEPT VISIT HI MDM: CPT

## 2020-10-04 PROCEDURE — 80320 DRUG SCREEN QUANTALCOHOLS: CPT | Performed by: EMERGENCY MEDICINE

## 2020-10-04 PROCEDURE — 80307 DRUG TEST PRSMV CHEM ANLYZR: CPT | Performed by: EMERGENCY MEDICINE

## 2020-10-04 PROCEDURE — 85025 COMPLETE CBC W/AUTO DIFF WBC: CPT | Performed by: EMERGENCY MEDICINE

## 2020-10-04 PROCEDURE — 96361 HYDRATE IV INFUSION ADD-ON: CPT

## 2020-10-04 PROCEDURE — 80053 COMPREHEN METABOLIC PANEL: CPT | Performed by: EMERGENCY MEDICINE

## 2020-10-04 PROCEDURE — 71045 X-RAY EXAM CHEST 1 VIEW: CPT | Performed by: EMERGENCY MEDICINE

## 2020-10-04 PROCEDURE — 93005 ELECTROCARDIOGRAM TRACING: CPT

## 2020-10-04 RX ORDER — SODIUM CHLORIDE 9 MG/ML
INJECTION, SOLUTION INTRAVENOUS CONTINUOUS
Status: DISCONTINUED | OUTPATIENT
Start: 2020-10-04 | End: 2020-10-04

## 2020-10-04 NOTE — ED INITIAL ASSESSMENT (HPI)
Pt presents to ed from ems c/o left sided chest pain and shortness of breath. Pt is a&ox4, moves all extremities well, resps easy.
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
## 2020-10-04 NOTE — ED NOTES
Patient resting on cot, talking with LINUS. No obvious distress noted. No needs expressed at this time.

## 2020-10-04 NOTE — BH LEVEL OF CARE ASSESSMENT
Level of Care Assessment Note    General Questions  Why are you here?: \"Chest pain.  I woke up and I was on the floor and I remember looking at my phone and wanting to call for a ride and then all the suddent I remember getting up off of the floor and that Inappropriate Physical Contact: No  Have you ever damaged/destroyed property or thought about it?: No    Access to Means  Has access to means to attempt suicide or harm others or property: No  Discussion of Removal of Access to Means: Pt denies SI  Access Treatment  Recovery Support Groups: Denies Past History  History of Seclusion/Restraint: No    Alcohol Use  How often do you have a drink containing alcohol? : 2-4 times per month(Pt guarded and may not be forthcoming/truthful with responses)  Alcohol Use Functional Impairment  Currently Attending School: No  Employment Status: Unemployed  Job Issues: (Pt reports being unemployed currently)  Concerns/Conflicts with Social Relationships: No  Decreased Functional Ability: (Pt denies issues with ADL Ordinary     Behavior  Exhibited behavior: Appropriate to situation;Participated    Assessment Summary  Assessment Summary: Al is a 31year old male patient who presents to [facility] emergency department due to chest pain.  Al reports a history of ADH

## 2020-10-04 NOTE — ED NOTES
Round on pt. Pt A/Ox3. Pt drowsy. Sts he had one drink yesterday. Denies any drug use.  Pt sts CP has improved since arrival.

## 2020-11-04 ENCOUNTER — APPOINTMENT (OUTPATIENT)
Dept: CT IMAGING | Facility: HOSPITAL | Age: 30
End: 2020-11-04
Attending: EMERGENCY MEDICINE
Payer: MEDICAID

## 2020-11-04 ENCOUNTER — HOSPITAL ENCOUNTER (EMERGENCY)
Facility: HOSPITAL | Age: 30
Discharge: HOME | End: 2020-11-04
Attending: EMERGENCY MEDICINE
Payer: MEDICAID

## 2020-11-04 VITALS
SYSTOLIC BLOOD PRESSURE: 147 MMHG | OXYGEN SATURATION: 97 % | HEIGHT: 68 IN | BODY MASS INDEX: 29.55 KG/M2 | WEIGHT: 195 LBS | DIASTOLIC BLOOD PRESSURE: 82 MMHG | TEMPERATURE: 98 F | HEART RATE: 70 BPM | RESPIRATION RATE: 18 BRPM

## 2020-11-04 DIAGNOSIS — S06.0X9A CONCUSSION WITH LOSS OF CONSCIOUSNESS, INITIAL ENCOUNTER: ICD-10-CM

## 2020-11-04 DIAGNOSIS — S09.90XA INJURY OF HEAD, INITIAL ENCOUNTER: Primary | ICD-10-CM

## 2020-11-04 PROCEDURE — 99284 EMERGENCY DEPT VISIT MOD MDM: CPT

## 2020-11-04 PROCEDURE — 70450 CT HEAD/BRAIN W/O DYE: CPT | Performed by: EMERGENCY MEDICINE

## 2020-11-04 RX ORDER — ACETAMINOPHEN 500 MG
1000 TABLET ORAL ONCE
Status: COMPLETED | OUTPATIENT
Start: 2020-11-04 | End: 2020-11-04

## 2020-11-04 RX ORDER — IBUPROFEN 600 MG/1
600 TABLET ORAL ONCE
Status: COMPLETED | OUTPATIENT
Start: 2020-11-04 | End: 2020-11-04
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
## 2021-05-25 VITALS
SYSTOLIC BLOOD PRESSURE: 139 MMHG | TEMPERATURE: 98.2 F | OXYGEN SATURATION: 96 % | HEART RATE: 70 BPM | DIASTOLIC BLOOD PRESSURE: 92 MMHG | RESPIRATION RATE: 16 BRPM

## (undated) NOTE — ED AVS SNAPSHOT
Jaswinder Chan   MRN: [id number]    Department:  [facility] Emergency Department   Date of Visit:  5/30/2019           Disclosure     Insurance plans vary and the physician(s) referred by the ER may not be covered by your plan.  Please contact yo tell this physician (or your personal doctor if your instructions are to return to your personal doctor) about any new or lasting problems. The primary care or specialist physician will see patients referred from the [facility] Emergency Department.  Rocky Guy

## (undated) NOTE — ED AVS SNAPSHOT
Barbra Daily   MRN: [id number]    Department:  [facility] Emergency Department   Date of Visit:  5/7/2018           Disclosure     Insurance plans vary and the physician(s) referred by the ER may not be covered by your plan.  Please contact you tell this physician (or your personal doctor if your instructions are to return to your personal doctor) about any new or lasting problems. The primary care or specialist physician will see patients referred from the [facility] Emergency Department.  Frederick Saldaña